# Patient Record
Sex: MALE | HISPANIC OR LATINO | Employment: OTHER | URBAN - METROPOLITAN AREA
[De-identification: names, ages, dates, MRNs, and addresses within clinical notes are randomized per-mention and may not be internally consistent; named-entity substitution may affect disease eponyms.]

---

## 2018-01-09 NOTE — MISCELLANEOUS
Provider Comments  Provider Comments:   Patient did not show up for his scheduled appointment today      Signatures   Electronically signed by : Bao Krause DO; Oct  4 2016  9:13AM EST                       (Author)

## 2018-01-12 NOTE — RESULT NOTES
Verified Results  (1) HEMOGLOBIN A1C [id number] 12:08PM Jose Carlos Cervantes Order Number: [id number]     Test Name Result Flag Reference   HEMOGLOBIN A1C 5 2 %  4 2-6 3   [id number] performed at [address] [place] [address], Reference range 4 8-5 6%   EST  AVG   GLUCOSE 103 mg/dl

## 2018-01-13 NOTE — RESULT NOTES
Verified Results  (1) ALT (SGPT) [id number] 12:08PM NasreentomAnup rivas [facility]     Test Name Result Flag Reference   ALT (SGPT) 16 U/L  12-78   - Patient Instructions: This is a fasting blood test  Water,black tea or black  coffee only after 9:00pm the night before test Drink 2 glasses of water the morning of test - Patient Instructions: This bloodwork is non-fasting  Please drink two glasses of   water morning of bloodwork  (1) AST (SGOT) [id number] 12:08PM Hernandez Pulliam     Test Name Result Flag Reference   AST(SGOT) 9 U/L  5-45   - Patient Instructions: This is a fasting blood test  Water,black tea or black  coffee only after 9:00pm the night before test Drink 2 glasses of water the morning of test - Patient Instructions: This bloodwork is non-fasting  Please drink two glasses of   water morning of bloodwork  (1) LIPID PANEL, FASTING [id number] 12:08PM Hernandez Pulliam     Test Name Result Flag Reference   CHOLESTEROL 111 mg/dL     HDL,DIRECT 47 mg/dL  40-60   Specimen collection should occur prior to Metamizole administration due to the potential for falsely depressed results  LDL CHOLESTEROL CALCULATED 21 mg/dL  0-100   - Patient Instructions: This is a fasting blood test  Water,black tea or black  coffee only after 9:00pm the night before test   Drink 2 glasses of water the morning of test     - Patient Instructions: This is a fasting blood test  Water,black tea or black  coffee only after 9:00pm the night before test Drink 2 glasses of water the morning of test - Patient Instructions: This bloodwork is non-fasting  Please drink two glasses of   water morning of bloodwork    Triglyceride:         Normal              <150 mg/dl       Borderline High    150-199 mg/dl       High               200-499 mg/dl       Very High          >499 mg/dl  Cholesterol:         Desirable        <200 mg/dl      Borderline High  200-239 mg/dl      High             >239 mg/dl  HDL Cholesterol:        High    >59 mg/dL      Low     <41 mg/dL  LDL CALCULATED:    This screening LDL is a calculated result  It does not have the accuracy of the Direct Measured LDL in the monitoring of patients with hyperlipidemia and/or statin therapy  Direct Measure LDL (IZD019) must be ordered separately in these patients  TRIGLYCERIDES 214 mg/dL H <=150   Specimen collection should occur prior to N-Acetylcysteine or Metamizole administration due to the potential for falsely depressed results  (1) MICROALBUMIN CREATININE RATIO, RANDOM URINE [id number] 12:08PM Heraclio Domi [facility]     Test Name Result Flag Reference   MICROALBUMIN/ CREAT R 6641 mg/g creatinine H 0-30   MICROALBUMIN,URINE 2610 0 mg/L H 0 0-20 0   CREATININE URINE 39 3 mg/dL       (1) T4, FREE [id number] 12:08PM Demetris Pulliamothy     Test Name Result Flag Reference   T4,FREE 0 82 ng/dL  0 76-1 46   - Patient Instructions: This is a fasting blood test  Water,black tea or black  coffee only after 9:00pm the night before test Drink 2 glasses of water the morning of test - Patient Instructions: This bloodwork is non-fasting  Please drink two glasses of   water morning of bloodwork  (1) TSH [id number] 12:08PM Hernandez Pulliam     Test Name Result Flag Reference   TSH 5 780 uIU/mL H 0 358-3 740   - Patient Instructions: This bloodwork is non-fasting  Please drink two glasses of water morning of bloodwork  - Patient Instructions: This is a fasting blood test  Water,black tea or black  coffee only after 9:00pm the night before test Drink 2 glasses of water the morning of test - Patient Instructions: This bloodwork is non-fasting  Please drink two glasses of   water morning of bloodwork  Patients undergoing fluorescein dye angiography may retain small amounts of fluorescein in the body for 48-72 hours post procedure  Samples containing fluorescein can produce falsely depressed TSH values   If the patient had this procedure,a specimen should be resubmitted post fluorescein clearance

## 2018-01-17 NOTE — MISCELLANEOUS
Provider Comments  Provider Comments:    The patient did not show up for his scheduled appointment nor did he complete his ordered blood work      Signatures   Electronically signed by : Carlos Patrick DO; Nov 29 2016 12:45PM EST                       (Author)

## 2018-01-22 ENCOUNTER — GENERIC CONVERSION - ENCOUNTER (OUTPATIENT)
Dept: OTHER | Facility: OTHER | Age: 73
End: 2018-01-22

## 2018-01-24 NOTE — RESULT NOTES
Verified Results  (1) HEMOGLOBIN A1C 22Jan2018 08:27AM Rosa Pulliam     Test Name Result Flag Reference   HEMOGLOBIN A1C 5 1     EST  AVG   GLUCOSE 185        Summary / No summary entered :      No summary entered   Documents attached :      [address] Work - Ariel Malik; Enc: [id number] - Image      Encounter - Ariel Malik - (Family Medicine) (Additional      Information Document)

## 2018-05-02 LAB — HBA1C MFR BLD HPLC: NORMAL %

## 2018-07-12 LAB — HBA1C MFR BLD HPLC: 4.9 %

## 2018-10-11 LAB — HBA1C MFR BLD HPLC: 5.6 %

## 2019-01-10 LAB — HBA1C MFR BLD HPLC: 5.7 %
